# Patient Record
Sex: FEMALE | Race: WHITE | NOT HISPANIC OR LATINO | Employment: FULL TIME | URBAN - METROPOLITAN AREA
[De-identification: names, ages, dates, MRNs, and addresses within clinical notes are randomized per-mention and may not be internally consistent; named-entity substitution may affect disease eponyms.]

---

## 2023-01-09 ENCOUNTER — EVALUATION (OUTPATIENT)
Dept: OCCUPATIONAL THERAPY | Facility: CLINIC | Age: 48
End: 2023-01-09

## 2023-01-09 DIAGNOSIS — S52.501D CLOSED FRACTURE OF DISTAL END OF RIGHT RADIUS WITH ROUTINE HEALING, UNSPECIFIED FRACTURE MORPHOLOGY, SUBSEQUENT ENCOUNTER: Primary | ICD-10-CM

## 2023-01-09 RX ORDER — IBUPROFEN 200 MG
200 TABLET ORAL EVERY 6 HOURS PRN
COMMUNITY

## 2023-01-09 NOTE — PROGRESS NOTES
OT Evaluation     Today's date: 2023  Patient name: Theresa Landau  : 1975  MRN: [id number]  Referring provider: Johnny Torre MD  Dx:   Encounter Diagnosis     ICD-10-CM    1  Closed fracture of distal end of right radius with routine healing, unspecified fracture morphology, subsequent encounter  S52 501D                      Assessment  Assessment details: Theresa Landau is a 49 y o , Right HD female referred to hand therapy for a left distal radius fracture with ORIF on 22  Onset of injury 22 due to fall while ice skating  Patient presents 23 with impaired ROM, strength, and sensation of the left hand  Deficits also noted in pain, edema and functional use of the left UE  Patient has a scar on the left distal forearm that is healing well with no signs or symptoms of infection and only mild adherence  Patient is a good candidate for OT services to abolish pain and edema and restore ROM, strength, coordination, and sensation for a return to independence in daily tasks      Impairments: abnormal coordination, abnormal or restricted ROM, activity intolerance, impaired physical strength, lacks appropriate home exercise program, pain with function and weight-bearing intolerance  Other impairment: Edema; impaired sensation thumb  Functional limitations: Minimal use of the right hand for self care or work tasks  Symptom irritability: highUnderstanding of Dx/Px/POC: excellent   Prognosis: good    Goals  STGs (4 weeks)  Patient will be independent in HEP of ROM, wound care, edema management  Patient will report an average pain level of 4/10  Patient will demonstrate 20 degree improvement in BERGER of the digits  Patient will demonstrate active wrist extension/flexion to 45/45  Patient will demonstrate active forearm pronation/supination to 80/45  Patient will demonstrate full wound healing  LTGs (12 weeks)  Patient will demonstrate independence in a HEP to maintain ROM, strength, and function at discharge  Patient will report an average pain level of 1-2/10 to be independent in daily tasks  Patient will demonstrate BERGER of the digits to WNL  to be independent in self care tasks  Patient will demonstrate AROM of the wrist and forearm WFL to be independent in self care tasks  Patient will demonstrate 5/5 muscle strength in the wrist and forearm to be MI for meal prep  Patient will demonstrate left hand strength within 30% of the right hand to be MI for IADL tasks  Patient will demonstrate resolution of edema      Plan  Plan details: 1/9/23; Begin OT 2x/wk x 6 weeks  Patient would benefit from: skilled occupational therapy and custom splinting  Planned modality interventions: ultrasound, thermotherapy: hydrocollator packs and cryotherapy  Planned therapy interventions: activity modification, compression, fine motor coordination training, graded activity, graded exercise, home exercise program, therapeutic exercise, therapeutic activities, stretching, strengthening, patient education, orthotic fitting/training, neuromuscular re-education and manual therapy  Other planned therapy interventions: IASTM, cupping  kinesio tape; scar and edema mgt  Frequency: 2x week  Duration in weeks: 12  Plan of Care beginning date: 1/9/2023  Plan of Care expiration date: 4/14/2023  Treatment plan discussed with: patient        Subjective Evaluation    History of Present Illness  Date of onset: 12/17/2022  Date of surgery: 12/22/2022  Mechanism of injury: surgery and trauma  Mechanism of injury: Patient reports she was ice skating on 12/17/22 when she fell back and to the side, fracturing her wrist  Went to ED where x rays indicated distal radius fracture  She was casted and referred to orthopedics  Had an ORIF 12/22/22  Patient now presents in a prefab wrist brace   She reports pain and stiffness in her wrist  Patient now presents for OT evaluation and treatment          Not a recurrent problem   Quality of life: good    Pain  Current pain ratin  At best pain ratin  At worst pain ratin  Location: Left wrist  Quality: dull ache and discomfort  Relieving factors: medications, rest and ice  Aggravating factors: nothing  Progression: improved    Social Support  Lives with: adult children    Employment status: working ([employer])  Hand dominance: right      Diagnostic Tests  X-ray: abnormal (Healing fracture)  Treatments  No previous or current treatments  Patient Goals  Patient goals for therapy: decreased edema, decreased pain, increased strength, independence with ADLs/IADLs and increased motion  Patient goal: Drive to work and long distances; do my hair        Objective     Observations     Left Wrist/Hand   Positive for adhesive scar and edema  Negative for drainage  Additional Observation Details  23; steri strips on scar    Tenderness     Left Wrist/Hand   Tenderness in the distal radioulnar joint  Additional Tenderness Details  23: Pain with palpation of distal radius    Neurological Testing     Sensation     Wrist/Hand   Left   Intact: light touch    Comments   Left light touch: Patient reports light tingling in tip of thumb     Active Range of Motion   Left Shoulder   Normal active range of motion    Left Elbow   Flexion: WFL  Extension: WFL  Forearm supination: 27 degrees   Forearm pronation: 70 degrees     Left Wrist   Wrist flexion: 35 degrees   Wrist extension: 25 degrees   Radial deviation: 5 degrees   Ulnar deviation: 20 degrees     Left Thumb   Flexion     CMC: 5 degrees    MP: 35 degrees    DIP: 35 degrees  Extension     DIP: -3 degrees  Palmar Abduction     CMC: 35 degrees  Radial abduction    CMC: 20 degrees  Opposition: 23; BERGER = 72   Kapandji Scale 6/10    Left Digits    Flexion   Index     MCP: 60    PIP: 80    DIP: 45  Middle     MCP: 75    PIP: 90    DIP: 55  Ring     MCP: 70    PIP: 90    DIP: 55  Little     MCP: 60    PIP: 90    DIP: 55  Extension   Index MCP: -10    PIP: -10  Middle     MCP: -20  Ring     MCP: -20  Little     MCP: -15    Additional Active Range of Motion Details  1/9/23: BERGER index = 165; middle = 180; ring = 195; small = 190    Swelling     Left Wrist/Hand   Thumb     Proximal: 16 5 cm  Circumference MCP: 18 7 cm  Circumference wrist: 17 cm    Right Wrist/Hand   Thumb     Proximal: 16 cm  Circumference MCP: 19 cm  Circumference wrist: 15 5 cm             Precautions: ORIF 12/22/22  AROM only until 6 weeks post op (2/2/23), then may start PROM   Access Code: [email or web address]  URL: [email or web address]  Date: 01/09/2023  Prepared by: Tawny Sinha    Exercises  • Seated Thumb IP Flexion AROM with Blocking - 3 x daily - 7 x weekly - 2 sets - 10 reps - 5 hold  • Thumb AROM Opposition To All Fingers - 3 x daily - 7 x weekly - 2 sets - 10 reps - 5 hold  • Seated Thumb Composite Flexion AROM - 3 x daily - 7 x weekly - 2 sets - 10 reps - 5 hold  • Finger Spreading - 3 x daily - 7 x weekly - 2 sets - 10 reps - 5 hold  • Wrist Tendon Gliding - 3 x daily - 7 x weekly - 2 sets - 10 reps - 5 hold  • Wrist AROM Flexion Extension - 3 x daily - 7 x weekly - 2 sets - 10 reps - 5 hold  • Wrist AROM Radial Ulnar Deviation - 3 x daily - 7 x weekly - 2 sets - 10 reps - 5 hold  • Seated Forearm Pronation and Supination AROM - 3 x daily - 7 x weekly - 2 sets - 10 reps - 5 hold          Date 1/9/23       Visit 1       Manuals        Edema massage Size C tg       Scar massage 3'                       Neuro Re-Ed         MNG                                                        Ther Ex        Blocking thumb        Opposition with thumb slides        AROM digits        TGE        Isolated digit ext        Digit abd/add        AROM wrist        Wrist maze        AROM FA        Cones for p/s                                Ther Activity        Opposition        Translation        HEP AROM digits, wrist, FA   Scar massage                       Modalities        MHP 5'

## 2023-01-09 NOTE — LETTER
2023    Desirae Mabry MD  [address]    Patient: Bhanu Ibarra   YOB: 1975   Date of Visit: 2023     Encounter Diagnosis     ICD-10-CM    1  Closed fracture of distal end of right radius with routine healing, unspecified fracture morphology, subsequent encounter  S52 501D           Dear Dr Kim Lopez:    Thank you for your recent referral of Bhanu Ibarra  Please review the attached evaluation summary from [facility]  48  recent visit  Please verify that you agree with the plan of care by signing the attached order  If you have any questions or concerns, please do not hesitate to call  I sincerely appreciate the opportunity to share in the care of one of your patients and hope to have another opportunity to work with you in the near future  Sincerely,    Lucille Taylor, OT      Referring Provider:     I certify that I have read the below Plan of Care and certify the need for these services furnished under this plan of treatment while under my care  Desirae Mabry MD  [address]  Via Fax: [phone]        OT Evaluation     Today's date: 2023  Patient name: Bhanu Ibarra  : 1975  MRN: [id number]  Referring provider: Elle Weaver MD  Dx:   Encounter Diagnosis     ICD-10-CM    1  Closed fracture of distal end of right radius with routine healing, unspecified fracture morphology, subsequent encounter  S52 501D                      Assessment  Assessment details: Bhanu Ibarra is a 49 y o , Right HD female referred to hand therapy for a left distal radius fracture with ORIF on 22  Onset of injury 22 due to fall while ice skating  Patient presents 23 with impaired ROM, strength, and sensation of the left hand  Deficits also noted in pain, edema and functional use of the left UE   Patient has a scar on the left distal forearm that is healing well with no signs or symptoms of infection and only mild adherence  Patient is a good candidate for OT services to abolish pain and edema and restore ROM, strength, coordination, and sensation for a return to independence in daily tasks  Impairments: abnormal coordination, abnormal or restricted ROM, activity intolerance, impaired physical strength, lacks appropriate home exercise program, pain with function and weight-bearing intolerance  Other impairment: Edema; impaired sensation thumb  Functional limitations: Minimal use of the right hand for self care or work tasks  Symptom irritability: highUnderstanding of Dx/Px/POC: excellent   Prognosis: good    Goals  STGs (4 weeks)  Patient will be independent in HEP of ROM, wound care, edema management  Patient will report an average pain level of 4/10  Patient will demonstrate 20 degree improvement in BERGER of the digits  Patient will demonstrate active wrist extension/flexion to 45/45  Patient will demonstrate active forearm pronation/supination to 80/45  Patient will demonstrate full wound healing  LTGs (12 weeks)  Patient will demonstrate independence in a HEP to maintain ROM, strength, and function at discharge  Patient will report an average pain level of 1-2/10 to be independent in daily tasks  Patient will demonstrate BERGER of the digits to WNL  to be independent in self care tasks  Patient will demonstrate AROM of the wrist and forearm WFL to be independent in self care tasks  Patient will demonstrate 5/5 muscle strength in the wrist and forearm to be MI for meal prep  Patient will demonstrate left hand strength within 30% of the right hand to be MI for IADL tasks  Patient will demonstrate resolution of edema      Plan  Plan details: 1/9/23;  Begin OT 2x/wk x 6 weeks  Patient would benefit from: skilled occupational therapy and custom splinting  Planned modality interventions: ultrasound, thermotherapy: hydrocollator packs and cryotherapy  Planned therapy interventions: activity modification, compression, fine motor coordination training, graded activity, graded exercise, home exercise program, therapeutic exercise, therapeutic activities, stretching, strengthening, patient education, orthotic fitting/training, neuromuscular re-education and manual therapy  Other planned therapy interventions: IASTM, cupping  kinesio tape; scar and edema mgt  Frequency: 2x week  Duration in weeks: 12  Plan of Care beginning date: 2023  Plan of Care expiration date: 2023  Treatment plan discussed with: patient        Subjective Evaluation    History of Present Illness  Date of onset: 2022  Date of surgery: 2022  Mechanism of injury: surgery and trauma  Mechanism of injury: Patient reports she was ice skating on 22 when she fell back and to the side, fracturing her wrist  Went to ED where x rays indicated distal radius fracture  She was casted and referred to orthopedics  Had an ORIF 22  Patient now presents in a prefab wrist brace  She reports pain and stiffness in her wrist  Patient now presents for OT evaluation and treatment          Not a recurrent problem   Quality of life: good    Pain  Current pain ratin  At best pain ratin  At worst pain ratin  Location: Left wrist  Quality: dull ache and discomfort  Relieving factors: medications, rest and ice  Aggravating factors: nothing  Progression: improved    Social Support  Lives with: adult children    Employment status: working ([employer])  Hand dominance: right      Diagnostic Tests  X-ray: abnormal (Healing fracture)  Treatments  No previous or current treatments  Patient Goals  Patient goals for therapy: decreased edema, decreased pain, increased strength, independence with ADLs/IADLs and increased motion  Patient goal: Drive to work and long distances; do my hair        Objective     Observations     Left Wrist/Hand   Positive for adhesive scar and edema   Negative for drainage  Additional Observation Details  1/9/23; steri strips on scar    Tenderness     Left Wrist/Hand   Tenderness in the distal radioulnar joint  Additional Tenderness Details  1/9/23: Pain with palpation of distal radius    Neurological Testing     Sensation     Wrist/Hand   Left   Intact: light touch    Comments   Left light touch: Patient reports light tingling in tip of thumb     Active Range of Motion   Left Shoulder   Normal active range of motion    Left Elbow   Flexion: WFL  Extension: WFL  Forearm supination: 27 degrees   Forearm pronation: 70 degrees     Left Wrist   Wrist flexion: 35 degrees   Wrist extension: 25 degrees   Radial deviation: 5 degrees   Ulnar deviation: 20 degrees     Left Thumb   Flexion     CMC: 5 degrees    MP: 35 degrees    DIP: 35 degrees  Extension     DIP: -3 degrees  Palmar Abduction     CMC: 35 degrees  Radial abduction    CMC: 20 degrees  Opposition: 1/9/23; BERGER = 72  Kapandji Scale 6/10    Left Digits    Flexion   Index     MCP: 60    PIP: 80    DIP: 45  Middle     MCP: 75    PIP: 90    DIP: 55  Ring     MCP: 70    PIP: 90    DIP: 55  Little     MCP: 60    PIP: 90    DIP: 55  Extension   Index     MCP: -10    PIP: -10  Middle     MCP: -20  Ring     MCP: -20  Little     MCP: -15    Additional Active Range of Motion Details  1/9/23: BERGER index = 165; middle = 180; ring = 195; small = 190    Swelling     Left Wrist/Hand   Thumb     Proximal: 16 5 cm  Circumference MCP: 18 7 cm  Circumference wrist: 17 cm    Right Wrist/Hand   Thumb     Proximal: 16 cm  Circumference MCP: 19 cm  Circumference wrist: 15 5 cm            Precautions: ORIF 12/22/22  AROM only until 6 weeks post op (2/2/23), then may start PROM   Access Code: [email or web address]  URL: [email or web address]  Date: 01/09/2023  Prepared by: Arlene Patel    Exercises  • Seated Thumb IP Flexion AROM with Blocking - 3 x daily - 7 x weekly - 2 sets - 10 reps - 5 hold  • Thumb AROM Opposition To All Fingers - 3 x daily - 7 x weekly - 2 sets - 10 reps - 5 hold  • Seated Thumb Composite Flexion AROM - 3 x daily - 7 x weekly - 2 sets - 10 reps - 5 hold  • Finger Spreading - 3 x daily - 7 x weekly - 2 sets - 10 reps - 5 hold  • Wrist Tendon Gliding - 3 x daily - 7 x weekly - 2 sets - 10 reps - 5 hold  • Wrist AROM Flexion Extension - 3 x daily - 7 x weekly - 2 sets - 10 reps - 5 hold  • Wrist AROM Radial Ulnar Deviation - 3 x daily - 7 x weekly - 2 sets - 10 reps - 5 hold  • Seated Forearm Pronation and Supination AROM - 3 x daily - 7 x weekly - 2 sets - 10 reps - 5 hold          Date 1/9/23       Visit 1       Manuals        Edema massage Size C tg       Scar massage 3'                       Neuro Re-Ed         MNG                                                        Ther Ex        Blocking thumb        Opposition with thumb slides        AROM digits        TGE        Isolated digit ext        Digit abd/add        AROM wrist        Wrist maze        AROM FA        Cones for p/s                                Ther Activity        Opposition        Translation        HEP AROM digits, wrist, FA   Scar massage                       Modalities        P 5'

## 2023-01-13 ENCOUNTER — OFFICE VISIT (OUTPATIENT)
Dept: OCCUPATIONAL THERAPY | Facility: CLINIC | Age: 48
End: 2023-01-13

## 2023-01-13 DIAGNOSIS — S52.501D CLOSED FRACTURE OF DISTAL END OF RIGHT RADIUS WITH ROUTINE HEALING, UNSPECIFIED FRACTURE MORPHOLOGY, SUBSEQUENT ENCOUNTER: Primary | ICD-10-CM

## 2023-01-13 NOTE — PROGRESS NOTES
Daily Note     Today's date: 2023  Patient name: Elyse Knight  : 1975  MRN: [id number]  Referring provider: Lula Thibodeaux MD  Dx:   Encounter Diagnosis     ICD-10-CM    1  Closed fracture of distal end of right radius with routine healing, unspecified fracture morphology, subsequent encounter  S52 501D                      Subjective: I can tell I'm moving more  I'm going to try to drive to work next week  Objective: See treatment diary below      Assessment: Tolerated treatment well  Patient exhibited good technique with therapeutic exercises  Patient instructed in scar massage  Mild edema still noted      Plan: Continue per plan of care  Precautions: ORIF 22  AROM only until 6 weeks post op (23), then may start PROM   Access Code: [email or web address]  URL: [email or web address]  Date: 2023  Prepared by: Jacob Mitchell    Exercises  • Seated Thumb IP Flexion AROM with Blocking - 3 x daily - 7 x weekly - 2 sets - 10 reps - 5 hold  • Thumb AROM Opposition To All Fingers - 3 x daily - 7 x weekly - 2 sets - 10 reps - 5 hold  • Seated Thumb Composite Flexion AROM - 3 x daily - 7 x weekly - 2 sets - 10 reps - 5 hold  • Finger Spreading - 3 x daily - 7 x weekly - 2 sets - 10 reps - 5 hold  • Wrist Tendon Gliding - 3 x daily - 7 x weekly - 2 sets - 10 reps - 5 hold  • Wrist AROM Flexion Extension - 3 x daily - 7 x weekly - 2 sets - 10 reps - 5 hold  • Wrist AROM Radial Ulnar Deviation - 3 x daily - 7 x weekly - 2 sets - 10 reps - 5 hold  • Seated Forearm Pronation and Supination AROM - 3 x daily - 7 x weekly - 2 sets - 10 reps - 5 hold          Date 23      Visit 1 2      Manuals        Edema massage Size C tg 5'      Scar massage 3' 5'                      Neuro Re-Ed         MNG                                                        Ther Ex        Blocking thumb  x10      Opposition with thumb slides  x10      AROM digits  5' AA      TGE        Isolated digit ext        Digit abd/add  Marbles 1 set      AROM wrist  5' AA      Wrist maze  x10      AROM FA  5' AA      Cones for p/s  1 set                              Ther Activity        Opposition        Translation  Marbles 1 set      HEP AROM digits, wrist, FA   Scar massage Scar massage                      Modalities        MHP 5' 5'

## 2023-01-16 ENCOUNTER — OFFICE VISIT (OUTPATIENT)
Dept: OCCUPATIONAL THERAPY | Facility: CLINIC | Age: 48
End: 2023-01-16

## 2023-01-16 DIAGNOSIS — S52.501D CLOSED FRACTURE OF DISTAL END OF RIGHT RADIUS WITH ROUTINE HEALING, UNSPECIFIED FRACTURE MORPHOLOGY, SUBSEQUENT ENCOUNTER: Primary | ICD-10-CM

## 2023-01-16 NOTE — PROGRESS NOTES
Daily Note     Today's date: 2023  Patient name: Frank Borrego  : 1975  MRN: [id number]  Referring provider: Monse Aguiar MD  Dx:   Encounter Diagnosis     ICD-10-CM    1  Closed fracture of distal end of right radius with routine healing, unspecified fracture morphology, subsequent encounter  S52 712D                      Subjective: I'm taking the splint off more at home and I take it off at night to sleep  I rest my hand on a squishy pillow at night      Objective: See treatment diary below      Assessment: Tolerated treatment well  Patient exhibited good technique with therapeutic exercises  Patient instructed in AAROM digits  Scar is fully healed and all steri strips have come off  Mild scar adherence noted  Plan: Continue per plan of care  Precautions: ORIF 22  AROM only until 6 weeks post op (23), then may start PROM   Access Code: [email or web address]  URL: [email or web address]  Date: 2023  Prepared by: Sandra Velasquez    Exercises  • Seated Thumb IP Flexion AROM with Blocking - 3 x daily - 7 x weekly - 2 sets - 10 reps - 5 hold  • Thumb AROM Opposition To All Fingers - 3 x daily - 7 x weekly - 2 sets - 10 reps - 5 hold  • Seated Thumb Composite Flexion AROM - 3 x daily - 7 x weekly - 2 sets - 10 reps - 5 hold  • Finger Spreading - 3 x daily - 7 x weekly - 2 sets - 10 reps - 5 hold  • Wrist Tendon Gliding - 3 x daily - 7 x weekly - 2 sets - 10 reps - 5 hold  • Wrist AROM Flexion Extension - 3 x daily - 7 x weekly - 2 sets - 10 reps - 5 hold  • Wrist AROM Radial Ulnar Deviation - 3 x daily - 7 x weekly - 2 sets - 10 reps - 5 hold  • Seated Forearm Pronation and Supination AROM - 3 x daily - 7 x weekly - 2 sets - 10 reps - 5 hold          Date 23     Visit 1 2 3     Manuals        Edema massage Size C tg 5' 5'     Scar massage 3' 5' 5'                     Neuro Re-Ed         MNG                                                        Ther Ex Blocking thumb  x10 x20     Opposition with thumb slides  x10 x20     AROM digits  5' AA 5' AA     TGE        Isolated digit ext   x10     Digit abd/add  Marbles 1 set X10; marbles 1 set     AROM wrist  5' AA 5' AA     Praying hand stretch   10" x 20     Wrist maze  x10      AROM FA  5' AA 5' AA     Cones for p/s  1 set      Wrist PREs ext, flex, dt   Med pvc pipe x 20 ea     FA PREs   Med pvc pipe x 20     Sustained grasp w/ pencils   2 sets                                     Ther Activity        Opposition        Translation  Marbles 1 set      HEP AROM digits, wrist, FA   Scar massage Scar massage Wrist, FA PREs with pvc pipe 1x/day                     Modalities        MHP 5' 5' 5'

## 2023-01-20 ENCOUNTER — OFFICE VISIT (OUTPATIENT)
Dept: OCCUPATIONAL THERAPY | Facility: CLINIC | Age: 48
End: 2023-01-20

## 2023-01-20 DIAGNOSIS — S52.501D CLOSED FRACTURE OF DISTAL END OF RIGHT RADIUS WITH ROUTINE HEALING, UNSPECIFIED FRACTURE MORPHOLOGY, SUBSEQUENT ENCOUNTER: Primary | ICD-10-CM

## 2023-01-20 NOTE — PROGRESS NOTES
Daily Note     Today's date: 2023  Patient name: Lola Orellana  : 1975  MRN: [id number]  Referring provider: Arya Parada MD  Dx:   Encounter Diagnosis     ICD-10-CM    1  Closed fracture of distal end of right radius with routine healing, unspecified fracture morphology, subsequent encounter  S52 501D                      Subjective: I drove to [facility] and it was okay      Objective: See treatment diary below  AROM wrist ext = 45 ( improved 20), flex = 45 ( improved 15), RD = 15 ( improved 10); UD = 35 ( improved 15)  AROM FA sup = 25 ( SQ); Pron = 75 ( improved 5 )    Assessment: Tolerated treatment well  Patient exhibited good technique with therapeutic exercises  Updated HEP for PROM  Still tight in all planes wrist and FA      Plan: Continue per plan of care  Precautions: ORIF 22  AROM only until 6 weeks post op (23), then may start PROM   Access Code: [email or web address]  URL: [email or web address]  Date: 2023  Prepared by: Rebel Mcallister    Exercises  • Seated Thumb IP Flexion AROM with Blocking - 3 x daily - 7 x weekly - 2 sets - 10 reps - 5 hold  • Thumb AROM Opposition To All Fingers - 3 x daily - 7 x weekly - 2 sets - 10 reps - 5 hold  • Seated Thumb Composite Flexion AROM - 3 x daily - 7 x weekly - 2 sets - 10 reps - 5 hold  • Finger Spreading - 3 x daily - 7 x weekly - 2 sets - 10 reps - 5 hold  • Wrist Tendon Gliding - 3 x daily - 7 x weekly - 2 sets - 10 reps - 5 hold  • Wrist AROM Flexion Extension - 3 x daily - 7 x weekly - 2 sets - 10 reps - 5 hold  • Wrist AROM Radial Ulnar Deviation - 3 x daily - 7 x weekly - 2 sets - 10 reps - 5 hold  • Seated Forearm Pronation and Supination AROM - 3 x daily - 7 x weekly - 2 sets - 10 reps - 5 hold          Date 23    Visit 1 2 3 4    Manuals        Edema massage Size C tg 5' 5' 5'    Scar massage 3' 5' 5' 5'                    Neuro Re-Ed         MNG Ther Ex        Blocking thumb  x10 x20 x20    Opposition with thumb slides  x10 x20 x20    AROM digits  5' AA 5' AA 5' AA    TGE        Isolated digit ext   x10 x10    Digit abd/add  Marbles 1 set X10; marbles 1 set     AROM wrist  5' AA 5' AA 5' AA    Praying hand stretch   10" x 20 10" x 20    Wrist maze  x10      AROM FA  5' AA 5' AA 5' AA    Cones for p/s  1 set      Wrist PREs ext, flex, dt   Med pvc pipe x 20 ea     FA PREs   Med pvc pipe x 20 Hammer stretch x 20    Sustained grasp w/ pencils   2 sets 2 sets                                    Ther Activity        Opposition        Translation  Marbles 1 set      HEP AROM digits, wrist, FA   Scar massage Scar massage Wrist, FA PREs with pvc pipe 1x/day Passive wrist, FA stretches                    Modalities        MHP 5' 5' 5' 5'

## 2023-01-27 ENCOUNTER — APPOINTMENT (OUTPATIENT)
Dept: OCCUPATIONAL THERAPY | Facility: CLINIC | Age: 48
End: 2023-01-27

## 2023-01-30 ENCOUNTER — OFFICE VISIT (OUTPATIENT)
Dept: OCCUPATIONAL THERAPY | Facility: CLINIC | Age: 48
End: 2023-01-30

## 2023-01-30 DIAGNOSIS — S52.501D CLOSED FRACTURE OF DISTAL END OF RIGHT RADIUS WITH ROUTINE HEALING, UNSPECIFIED FRACTURE MORPHOLOGY, SUBSEQUENT ENCOUNTER: Primary | ICD-10-CM

## 2023-01-30 NOTE — PROGRESS NOTES
Daily Note     Today's date: 2023  Patient name: Lola Orellana  : 1975  MRN: [id number]  Referring provider: Arya Parada MD  Dx:   Encounter Diagnosis     ICD-10-CM    1  Closed fracture of distal end of right radius with routine healing, unspecified fracture morphology, subsequent encounter  S52 501D                      Subjective: I've been doing the hammer stretch and I think that really helps      Objective: See treatment diary below      Assessment: Tolerated treatment well  Patient exhibited good technique with therapeutic exercises  Improved thumb flexion and forearm roation noted  Scar adherence still noted  Patient reports she is not wearing her splint very often anymore      Plan: Continue per plan of care  Precautions: ORIF 22  AROM only until 6 weeks post op (23), then may start PROM   Access Code: [email or web address]  URL: [email or web address]  Date: 2023  Prepared by: Rebel Mcallister    Exercises  • Seated Thumb IP Flexion AROM with Blocking - 3 x daily - 7 x weekly - 2 sets - 10 reps - 5 hold  • Thumb AROM Opposition To All Fingers - 3 x daily - 7 x weekly - 2 sets - 10 reps - 5 hold  • Seated Thumb Composite Flexion AROM - 3 x daily - 7 x weekly - 2 sets - 10 reps - 5 hold  • Finger Spreading - 3 x daily - 7 x weekly - 2 sets - 10 reps - 5 hold  • Wrist Tendon Gliding - 3 x daily - 7 x weekly - 2 sets - 10 reps - 5 hold  • Wrist AROM Flexion Extension - 3 x daily - 7 x weekly - 2 sets - 10 reps - 5 hold  • Wrist AROM Radial Ulnar Deviation - 3 x daily - 7 x weekly - 2 sets - 10 reps - 5 hold  • Seated Forearm Pronation and Supination AROM - 3 x daily - 7 x weekly - 2 sets - 10 reps - 5 hold          Date 23   Visit 1 2 3 4 5   Manuals        Edema massage Size C tg 5' 5' 5' 5'   Scar massage 3' 5' 5' 5' 5'   Kinesio tape for scar mobility     2'           Neuro Re-Ed         MNG Ther Ex        Blocking thumb  x10 x20 x20 x10   Opposition with thumb slides  x10 x20 x20 x20   AROM digits  5' AA 5' AA 5' AA 5' A   TGE        Isolated digit ext   x10 x10    Digit abd/add  Marbles 1 set X10; marbles 1 set  Marbles 1 set   AROM wrist  5' AA 5' AA 5' AA 5' AA   Praying hand stretch   10" x 20 10" x 20 x10   Wrist maze  x10   x10   AROM FA  5' AA 5' AA 5' AA 5' A   Cones for p/s  1 set      Wrist PREs ext, flex, dt   Med pvc pipe x 20 ea  Med pvc x 20 ea   FA PREs   Med pvc pipe x 20 Hammer stretch x 20 R FB x 30   Sustained grasp w/ pencils   2 sets 2 sets    Ball on wall for wrist ext     x10 w/ soft ball                           Ther Activity        Opposition        Translation  Marbles 1 set   Marbles 1 set   HEP AROM digits, wrist, FA   Scar massage Scar massage Wrist, FA PREs with pvc pipe 1x/day Passive wrist, FA stretches Kinesio tape wear, care, precuations                   Modalities        MHP 5' 5' 5' 5' 5'

## 2023-02-02 ENCOUNTER — OFFICE VISIT (OUTPATIENT)
Dept: OCCUPATIONAL THERAPY | Facility: CLINIC | Age: 48
End: 2023-02-02

## 2023-02-02 DIAGNOSIS — S52.501D CLOSED FRACTURE OF DISTAL END OF RIGHT RADIUS WITH ROUTINE HEALING, UNSPECIFIED FRACTURE MORPHOLOGY, SUBSEQUENT ENCOUNTER: Primary | ICD-10-CM

## 2023-02-02 NOTE — PROGRESS NOTES
Daily Note     Today's date: 2023  Patient name: Susan Egn  : 1975  MRN: [id number]  Referring provider: Charli Pak MD  Dx:   Encounter Diagnosis     ICD-10-CM    1  Closed fracture of distal end of right radius with routine healing, unspecified fracture morphology, subsequent encounter  S52 501D                      Subjective: I liked the tape on my scar  Objective: See treatment diary below      Assessment: Tolerated treatment well  Patient exhibited good technique with therapeutic exercises and would benefit from continued OT  Improved AROM digits, wrist, forearm  Some hypersensitivity still noted in scar      Plan: Continue per plan of care  Progress note during next visit  Precautions: ORIF 22  AROM only until 6 weeks post op (23), then may start PROM   Access Code: [email or web address]  URL: [email or web address]  Date: 2023  Prepared by: Harrison Franco    Exercises  • Seated Thumb IP Flexion AROM with Blocking - 3 x daily - 7 x weekly - 2 sets - 10 reps - 5 hold  • Thumb AROM Opposition To All Fingers - 3 x daily - 7 x weekly - 2 sets - 10 reps - 5 hold  • Seated Thumb Composite Flexion AROM - 3 x daily - 7 x weekly - 2 sets - 10 reps - 5 hold  • Finger Spreading - 3 x daily - 7 x weekly - 2 sets - 10 reps - 5 hold  • Wrist Tendon Gliding - 3 x daily - 7 x weekly - 2 sets - 10 reps - 5 hold  • Wrist AROM Flexion Extension - 3 x daily - 7 x weekly - 2 sets - 10 reps - 5 hold  • Wrist AROM Radial Ulnar Deviation - 3 x daily - 7 x weekly - 2 sets - 10 reps - 5 hold  • Seated Forearm Pronation and Supination AROM - 3 x daily - 7 x weekly - 2 sets - 10 reps - 5 hold          Date 23   Visit 6 2 3 4 5   Manuals        Edema massage 5' 5' 5' 5' 5'   Scar massage 5' 5' 5' 5' 5'   Kinesio tape for scar mobility 2'    2'           Neuro Re-Ed         MNG distal x10                                                       Ther Ex Blocking thumb x10 x10 x20 x20 x10   Opposition with thumb slides x20 x10 x20 x20 x20   AROM digits 3' AA 5' AA 5' AA 5' AA 5' A   TGE        Isolated digit ext x10  x10 x10    Digit abd/add  Marbles 1 set X10; marbles 1 set  Marbles 1 set   AROM wrist  5' AA 5' AA 5' AA 5' AA   Praying hand stretch 10" x 10  10" x 20 10" x 20 x10   Wrist maze  x10   x10   AROM FA 5' AA 5' AA 5' AA 5' AA 5' A   Cones for p/s  1 set      Wrist PREs ext, flex, dt 1# x 20  Med pvc pipe x 20 ea  Med pvc x 20 ea   FA PREs 1# x 20 ea  Med pvc pipe x 20 Hammer stretch x 20 R FB x 30   Sustained grasp w/ pencils   2 sets 2 sets    Ball on wall for wrist ext     x10 w/ soft ball    strength HG 15# x 20                       Ther Activity        Opposition        Translation  Marbles 1 set   Marbles 1 set   HEP Median nerve glide Scar massage Wrist, FA PREs with pvc pipe 1x/day Passive wrist, FA stretches Kinesio tape wear, care, precuations                   Modalities        MHP 5' 5' 5' 5' 5'

## 2023-02-03 ENCOUNTER — APPOINTMENT (OUTPATIENT)
Dept: OCCUPATIONAL THERAPY | Facility: CLINIC | Age: 48
End: 2023-02-03

## 2023-02-06 ENCOUNTER — OFFICE VISIT (OUTPATIENT)
Dept: OCCUPATIONAL THERAPY | Facility: CLINIC | Age: 48
End: 2023-02-06

## 2023-02-06 DIAGNOSIS — S52.501D CLOSED FRACTURE OF DISTAL END OF RIGHT RADIUS WITH ROUTINE HEALING, UNSPECIFIED FRACTURE MORPHOLOGY, SUBSEQUENT ENCOUNTER: Primary | ICD-10-CM

## 2023-02-06 NOTE — LETTER
2023    Aditya Grider, Rajendra [address]    Patient: Tim Gallo   YOB: 1975   Date of Visit: 2023     Encounter Diagnosis     ICD-10-CM    1  Closed fracture of distal end of right radius with routine healing, unspecified fracture morphology, subsequent encounter  S52 501D           Dear Dr Cheryl Aguilar:    Thank you for your recent referral of Tim Gallo  Please review the attached evaluation summary from Ryan MERCEDES  48  recent visit  Please verify that you agree with the plan of care by signing the attached order  If you have any questions or concerns, please do not hesitate to call  I sincerely appreciate the opportunity to share in the care of one of your patients and hope to have another opportunity to work with you in the near future  Sincerely,    Blanche Guzman, COOPER      Referring Provider:     I certify that I have read the below Plan of Care and certify the need for these services furnished under this plan of treatment while under my care  Aditya Grider MD  [address] [place] [address]  Via Fax: [phone]        OT Re-Evaluation     Today's date: 2023  Patient name: Tim Gallo  : 1975  MRN: [id number]  Referring provider: Clari Yang MD  Dx:   Encounter Diagnosis     ICD-10-CM    1  Closed fracture of distal end of right radius with routine healing, unspecified fracture morphology, subsequent encounter  S52 501D                      Assessment  Assessment details: Tim Gallo is a 49 y o , Right HD female referred to hand therapy for a left distal radius fracture with ORIF on 22  Onset of injury 22 due to fall while ice skating  Patient presents 23 with impaired ROM, strength, and sensation of the left hand  Deficits also noted in pain, edema and functional use of the left UE   Patient has a scar on the left distal forearm that is healing well with no signs or symptoms of infection and only mild adherence  Patient is a good candidate for OT services to abolish pain and edema and restore ROM, strength, coordination, and sensation for a return to independence in daily tasks  2/6/23: Patient seen 6 times in OT  She is compliant with HEP of A/PROM, scar massage  She is no longer using her splint for light activities  Mild to moderate pain and scar adherence  Improved ROM in the forearm, wrist, digits  Strength not initiated  Continue OT 2x/wk x 8 weeks  Impairments: abnormal coordination, abnormal or restricted ROM, activity intolerance, impaired physical strength, lacks appropriate home exercise program, pain with function and weight-bearing intolerance  Other impairment: Edema; impaired sensation thumb  Functional limitations: Moderate use of the right hand for self care or work tasks  Symptom irritability: moderateUnderstanding of Dx/Px/POC: excellent   Prognosis: good    Goals  STGs (4 weeks)  Patient will be independent in HEP of ROM, wound care, edema management  MET  Patient will report an average pain level of 4/10  MET  Patient will demonstrate 20 degree improvement in BERGER of the digits  MET  Patient will demonstrate active wrist extension/flexion to 45/45  MET  Patient will demonstrate active forearm pronation/supination to 80/45  PARTIALLY MET  Patient will demonstrate full wound healing  MET  LTGs (12 weeks)  Patient will demonstrate independence in a HEP to maintain ROM, strength, and function at discharge  ON GOING  Patient will report an average pain level of 1-2/10 to be independent in daily tasks  NOT MET  Patient will demonstrate BERGER of the digits to WNL  to be independent in self care tasks  NOT MET  Patient will demonstrate AROM of the wrist and forearm WFL to be independent in self care tasks  Patient will demonstrate 5/5 muscle strength in the wrist and forearm to be MI for meal prep   NOT ADDRESSED  Patient will demonstrate left hand strength within 30% of the right hand to be MI for IADL tasks  NOT ADDRESSED  Patient will demonstrate resolution of edema  NOT MET      Plan  Plan details: 23; Begin OT 2x/wk x 6 weeks  23: Continue OT 2x/wk x 6 weeks  Patient would benefit from: skilled occupational therapy and custom splinting  Planned modality interventions: ultrasound, thermotherapy: hydrocollator packs and cryotherapy  Planned therapy interventions: activity modification, compression, fine motor coordination training, graded activity, graded exercise, home exercise program, therapeutic exercise, therapeutic activities, stretching, strengthening, patient education, orthotic fitting/training, neuromuscular re-education and manual therapy  Other planned therapy interventions: IASTM, cupping  kinesio tape; scar and edema mgt  Frequency: 2x week  Duration in weeks: 12  Plan of Care beginning date: 2023  Plan of Care expiration date: 2023  Treatment plan discussed with: patient        Subjective Evaluation    History of Present Illness  Date of onset: 2022  Date of surgery: 2022  Mechanism of injury: surgery and trauma  Mechanism of injury: Patient reports she was ice skating on 22 when she fell back and to the side, fracturing her wrist  Went to ED where x rays indicated distal radius fracture  She was casted and referred to orthopedics  Had an ORIF 22  Patient now presents in a prefab wrist brace  She reports pain and stiffness in her wrist  Patient now presents for OT evaluation and treatment    23; I still have trouble opening tight jars  I can  and carry light things now   I'm not wearing the brace now          Not a recurrent problem   Quality of life: good    Pain  Current pain ratin  At best pain ratin  At worst pain ratin  Location: Left wrist  Quality: dull ache and discomfort  Relieving factors: medications, rest and ice  Aggravating factors: nothing  Progression: improved    Social Support  Lives with: adult children    Employment status: working ([employer])  [employer] dominance: right      Diagnostic Tests  X-ray: abnormal (Healing fracture)  Treatments  Current treatment: occupational therapy  Patient Goals  Patient goals for therapy: decreased edema, decreased pain, increased strength, independence with ADLs/IADLs and increased motion  Patient goal: Drive to work and long distances; do my hair        Objective     Observations     Left Wrist/Hand   Positive for adhesive scar and edema  Negative for drainage  Additional Observation Details  1/9/23; steri strips on scar  2/6/23: Scar fully healed with mild adherence and edema    Tenderness     Left Wrist/Hand   No tenderness in the distal radioulnar joint  Additional Tenderness Details  1/9/23: Pain with palpation of distal radius  2/6/23: Mild tenderness distal radius    Neurological Testing     Sensation     Wrist/Hand   Left   Intact: light touch    Comments   Left light touch: Patient reports light tingling in tip of thumb     Additional Neurological Details  2/6/23: Patient still reports tingling tip of thumb    Active Range of Motion   Left Shoulder   Normal active range of motion    Left Elbow   Flexion: WFL  Extension: WFL  Forearm supination: 40 degrees   Forearm pronation: 85 degrees WFL    Left Wrist   Wrist flexion: 60 degrees   Wrist extension: 53 degrees   Radial deviation: 20 degrees WFL  Ulnar deviation: 35 degrees     Left Thumb   Flexion     CMC: 20 degrees    MP: 60 degrees    DIP: 45 degrees  Palmar Abduction     CMC: 35 degrees  Radial abduction    CMC: 45 degrees  Opposition: 1/9/23; BERGER = 72  Kapandji Scale 6/10  2/6/23: BERGER = 125   Kapandji Scale 9/10    Additional Active Range of Motion Details  2/6/23: Supination improved 13 degrees and pronation improved 15 degrees  2/6/23: flexion improved 25 degrees; extension improved 28 degrees; radial and ulnar deviation improved 15 degrees each  1/9/23: BERGER index = 165; middle = 180; ring = 195; small = 190  2/6/23: Now able to make a composite fist  End range intrinsic tightness noted    Swelling     Left Wrist/Hand   Thumb     Proximal: 6 cm  Circumference MCP: 18 7 cm  Circumference wrist: 16 5 cm    Right Wrist/Hand   Thumb     Proximal: 6 cm  Circumference MCP: 19 cm  Circumference wrist: 15 5 cm            Precautions: ORIF 12/22/22  AROM only until 6 weeks post op (2/2/23), then may start PROM   Access Code: [email or web address]  URL: [email or web address]  Date: 01/09/2023  Prepared by: Mirian Show    Exercises  • Seated Thumb IP Flexion AROM with Blocking - 3 x daily - 7 x weekly - 2 sets - 10 reps - 5 hold  • Thumb AROM Opposition To All Fingers - 3 x daily - 7 x weekly - 2 sets - 10 reps - 5 hold  • Seated Thumb Composite Flexion AROM - 3 x daily - 7 x weekly - 2 sets - 10 reps - 5 hold  • Finger Spreading - 3 x daily - 7 x weekly - 2 sets - 10 reps - 5 hold  • Wrist Tendon Gliding - 3 x daily - 7 x weekly - 2 sets - 10 reps - 5 hold  • Wrist AROM Flexion Extension - 3 x daily - 7 x weekly - 2 sets - 10 reps - 5 hold  • Wrist AROM Radial Ulnar Deviation - 3 x daily - 7 x weekly - 2 sets - 10 reps - 5 hold  • Seated Forearm Pronation and Supination AROM - 3 x daily - 7 x weekly - 2 sets - 10 reps - 5 hold          Date 2/2/23 2/6/23 1/16/23 1/20 1/30/23   Visit 6 7 RE 3 4 5   Manuals        Edema massage 5' 5' 5' 5' 5'   Scar massage 5' 5' 5' 5' 5'   Kinesio tape for scar mobility 2'    2'           Neuro Re-Ed         MNG distal x10                                                       Ther Ex        Blocking thumb x10 x10 x20 x20 x10   Opposition with thumb slides x20 x10 x20 x20 x20   AROM digits 3' AA 5' AA 5' AA 5' AA 5' A   TGE        Isolated digit ext x10  x10 x10    Digit abd/add  Marbles 1 set X10; marbles 1 set  Marbles 1 set   Intrinsic minus stretch  10" x 10      AROM wrist 5' AA 5' AA 5' AA 5' AA   Praying hand stretch 10" x 10 x10 10" x 20 10" x 20 x10   Wrist maze     x10   AROM FA 5' AA 5' AA 5' AA 5' AA 5' A   Cones for p/s        Wrist PREs ext, flex, dt 1# x 20  Med pvc pipe x 20 ea  Med pvc x 20 ea   FA PREs 1# x 20 ea  Med pvc pipe x 20 Hammer stretch x 20 R FB x 30   Sustained grasp w/ pencils   2 sets 2 sets    Ball on wall for wrist ext     x10 w/ soft ball    strength HG 15# x 20                       Ther Activity        Opposition        Translation     Marbles 1 set   HEP Median nerve glide Results of testing; POC; intrinsic minus stretch Wrist, FA PREs with pvc pipe 1x/day Passive wrist, FA stretches Kinesio tape wear, care, precuations                   Modalities        MHP 5' 5' 5' 5' 5'

## 2023-02-06 NOTE — PROGRESS NOTES
OT Re-Evaluation     Today's date: 2023  Patient name: Kit Eli  : 1975  MRN: [id number]  Referring provider: Apolinar Pichardo MD  Dx:   Encounter Diagnosis     ICD-10-CM    1  Closed fracture of distal end of right radius with routine healing, unspecified fracture morphology, subsequent encounter  S52 501D                      Assessment  Assessment details: Kit Eli is a 49 y o , Right HD female referred to hand therapy for a left distal radius fracture with ORIF on 22  Onset of injury 22 due to fall while ice skating  Patient presents 23 with impaired ROM, strength, and sensation of the left hand  Deficits also noted in pain, edema and functional use of the left UE  Patient has a scar on the left distal forearm that is healing well with no signs or symptoms of infection and only mild adherence  Patient is a good candidate for OT services to abolish pain and edema and restore ROM, strength, coordination, and sensation for a return to independence in daily tasks  23: Patient seen 6 times in OT  She is compliant with HEP of A/PROM, scar massage  She is no longer using her splint for light activities  Mild to moderate pain and scar adherence  Improved ROM in the forearm, wrist, digits  Strength not initiated  Continue OT 2x/wk x 8 weeks  Impairments: abnormal coordination, abnormal or restricted ROM, activity intolerance, impaired physical strength, lacks appropriate home exercise program, pain with function and weight-bearing intolerance  Other impairment: Edema; impaired sensation thumb  Functional limitations: Moderate use of the right hand for self care or work tasks  Symptom irritability: moderateUnderstanding of Dx/Px/POC: excellent   Prognosis: good    Goals  STGs (4 weeks)  Patient will be independent in HEP of ROM, wound care, edema management  MET  Patient will report an average pain level of 4/10   MET  Patient will demonstrate 20 degree improvement in BERGER of the digits  MET  Patient will demonstrate active wrist extension/flexion to 45/45  MET  Patient will demonstrate active forearm pronation/supination to 80/45  PARTIALLY MET  Patient will demonstrate full wound healing  MET  LTGs (12 weeks)  Patient will demonstrate independence in a HEP to maintain ROM, strength, and function at discharge  ON GOING  Patient will report an average pain level of 1-2/10 to be independent in daily tasks  NOT MET  Patient will demonstrate BERGER of the digits to WNL  to be independent in self care tasks  NOT MET  Patient will demonstrate AROM of the wrist and forearm WFL to be independent in self care tasks  Patient will demonstrate 5/5 muscle strength in the wrist and forearm to be MI for meal prep  NOT ADDRESSED  Patient will demonstrate left hand strength within 30% of the right hand to be MI for IADL tasks  NOT ADDRESSED  Patient will demonstrate resolution of edema  NOT MET      Plan  Plan details: 1/9/23;  Begin OT 2x/wk x 6 weeks  2/6/23: Continue OT 2x/wk x 6 weeks  Patient would benefit from: skilled occupational therapy and custom splinting  Planned modality interventions: ultrasound, thermotherapy: hydrocollator packs and cryotherapy  Planned therapy interventions: activity modification, compression, fine motor coordination training, graded activity, graded exercise, home exercise program, therapeutic exercise, therapeutic activities, stretching, strengthening, patient education, orthotic fitting/training, neuromuscular re-education and manual therapy  Other planned therapy interventions: IASTM, cupping  kinesio tape; scar and edema mgt  Frequency: 2x week  Duration in weeks: 12  Plan of Care beginning date: 1/9/2023  Plan of Care expiration date: 4/14/2023  Treatment plan discussed with: patient        Subjective Evaluation    History of Present Illness  Date of onset: 12/17/2022  Date of surgery: 12/22/2022  Mechanism of injury: surgery and trauma  Mechanism of injury: Patient reports she was ice skating on 22 when she fell back and to the side, fracturing her wrist  Went to ED where x rays indicated distal radius fracture  She was casted and referred to orthopedics  Had an ORIF 22  Patient now presents in a prefab wrist brace  She reports pain and stiffness in her wrist  Patient now presents for OT evaluation and treatment    23; I still have trouble opening tight jars  I can  and carry light things now  I'm not wearing the brace now          Not a recurrent problem   Quality of life: good    Pain  Current pain ratin  At best pain ratin  At worst pain ratin  Location: Left wrist  Quality: dull ache and discomfort  Relieving factors: medications, rest and ice  Aggravating factors: nothing  Progression: improved    Social Support  Lives with: adult children    Employment status: working ([employer])  Hand dominance: right      Diagnostic Tests  X-ray: abnormal (Healing fracture)  Treatments  Current treatment: occupational therapy  Patient Goals  Patient goals for therapy: decreased edema, decreased pain, increased strength, independence with ADLs/IADLs and increased motion  Patient goal: Drive to work and long distances; do my hair        Objective     Observations     Left Wrist/Hand   Positive for adhesive scar and edema  Negative for drainage  Additional Observation Details  23; steri strips on scar  23: Scar fully healed with mild adherence and edema    Tenderness     Left Wrist/Hand   No tenderness in the distal radioulnar joint       Additional Tenderness Details  23: Pain with palpation of distal radius  23: Mild tenderness distal radius    Neurological Testing     Sensation     Wrist/Hand   Left   Intact: light touch    Comments   Left light touch: Patient reports light tingling in tip of thumb     Additional Neurological Details  23: Patient still reports tingling tip of thumb    Active Range of Motion   Left Shoulder   Normal active range of motion    Left Elbow   Flexion: WFL  Extension: WFL  Forearm supination: 40 degrees   Forearm pronation: 85 degrees WFL    Left Wrist   Wrist flexion: 60 degrees   Wrist extension: 53 degrees   Radial deviation: 20 degrees WFL  Ulnar deviation: 35 degrees     Left Thumb   Flexion     CMC: 20 degrees    MP: 60 degrees    DIP: 45 degrees  Palmar Abduction     CMC: 35 degrees  Radial abduction    CMC: 45 degrees  Opposition: 1/9/23; BERGER = 72  Kapandji Scale 6/10  2/6/23: BERGER = 125  Kapandji Scale 9/10    Additional Active Range of Motion Details  2/6/23: Supination improved 13 degrees and pronation improved 15 degrees  2/6/23: flexion improved 25 degrees; extension improved 28 degrees; radial and ulnar deviation improved 15 degrees each  1/9/23: BERGER index = 165; middle = 180; ring = 195; small = 190  2/6/23: Now able to make a composite fist  End range intrinsic tightness noted    Swelling     Left Wrist/Hand   Thumb     Proximal: 6 cm  Circumference MCP: 18 7 cm  Circumference wrist: 16 5 cm    Right Wrist/Hand   Thumb     Proximal: 6 cm  Circumference MCP: 19 cm  Circumference wrist: 15 5 cm             Precautions: ORIF 12/22/22  AROM only until 6 weeks post op (2/2/23), then may start PROM   Access Code: [email or web address]  URL: [email or web address]  Date: 01/09/2023  Prepared by: Sarahi Alhaji    Exercises  • Seated Thumb IP Flexion AROM with Blocking - 3 x daily - 7 x weekly - 2 sets - 10 reps - 5 hold  • Thumb AROM Opposition To All Fingers - 3 x daily - 7 x weekly - 2 sets - 10 reps - 5 hold  • Seated Thumb Composite Flexion AROM - 3 x daily - 7 x weekly - 2 sets - 10 reps - 5 hold  • Finger Spreading - 3 x daily - 7 x weekly - 2 sets - 10 reps - 5 hold  • Wrist Tendon Gliding - 3 x daily - 7 x weekly - 2 sets - 10 reps - 5 hold  • Wrist AROM Flexion Extension - 3 x daily - 7 x weekly - 2 sets - 10 reps - 5 hold  • Wrist AROM Radial Ulnar Deviation - 3 x daily - 7 x weekly - 2 sets - 10 reps - 5 hold  • Seated Forearm Pronation and Supination AROM - 3 x daily - 7 x weekly - 2 sets - 10 reps - 5 hold          Date 2/2/23 2/6/23 1/16/23 1/20 1/30/23   Visit 6 7 RE 3 4 5   Manuals        Edema massage 5' 5' 5' 5' 5'   Scar massage 5' 5' 5' 5' 5'   Kinesio tape for scar mobility 2'    2'           Neuro Re-Ed         MNG distal x10                                                       Ther Ex        Blocking thumb x10 x10 x20 x20 x10   Opposition with thumb slides x20 x10 x20 x20 x20   AROM digits 3' AA 5' AA 5' AA 5' AA 5' A   TGE        Isolated digit ext x10  x10 x10    Digit abd/add  Marbles 1 set X10; marbles 1 set  Marbles 1 set   Intrinsic minus stretch  10" x 10      AROM wrist  5' AA 5' AA 5' AA 5' AA   Praying hand stretch 10" x 10 x10 10" x 20 10" x 20 x10   Wrist maze     x10   AROM FA 5' AA 5' AA 5' AA 5' AA 5' A   Cones for p/s        Wrist PREs ext, flex, dt 1# x 20  Med pvc pipe x 20 ea  Med pvc x 20 ea   FA PREs 1# x 20 ea  Med pvc pipe x 20 Hammer stretch x 20 R FB x 30   Sustained grasp w/ pencils   2 sets 2 sets    Ball on wall for wrist ext     x10 w/ soft ball    strength HG 15# x 20                       Ther Activity        Opposition        Translation     Marbles 1 set   HEP Median nerve glide Results of testing; POC; intrinsic minus stretch Wrist, FA PREs with pvc pipe 1x/day Passive wrist, FA stretches Kinesio tape wear, care, precuations                   Modalities        MHP 5' 5' 5' 5' 5'

## 2023-02-07 ENCOUNTER — TELEPHONE (OUTPATIENT)
Dept: OBGYN CLINIC | Age: 48
End: 2023-02-07

## 2023-02-10 ENCOUNTER — APPOINTMENT (OUTPATIENT)
Dept: OCCUPATIONAL THERAPY | Facility: CLINIC | Age: 48
End: 2023-02-10

## 2023-02-21 ENCOUNTER — OFFICE VISIT (OUTPATIENT)
Dept: OCCUPATIONAL THERAPY | Facility: CLINIC | Age: 48
End: 2023-02-21

## 2023-02-21 DIAGNOSIS — S52.501D CLOSED FRACTURE OF DISTAL END OF RIGHT RADIUS WITH ROUTINE HEALING, UNSPECIFIED FRACTURE MORPHOLOGY, SUBSEQUENT ENCOUNTER: Primary | ICD-10-CM

## 2023-02-21 NOTE — PROGRESS NOTES
Daily Note     Today's date: 2023  Patient name: Neda Mccloud  : 1975  MRN: [id number]  Referring provider: Brian Clifford MD  Dx:   Encounter Diagnosis     ICD-10-CM    1  Closed fracture of distal end of right radius with routine healing, unspecified fracture morphology, subsequent encounter  S52 501D                      Subjective: I'm trying to use my hand at home  My pain level changes  Yesterday was a -3/10  Objective: See treatment diary below      Assessment: Tolerated treatment well  Patient exhibited good technique with therapeutic exercises  Reports most paresthesia has resolved  Only occasional tingling tip of thumb      Plan: Continue per plan of care  Precautions: ORIF 22  AROM only until 6 weeks post op (23), then may start PROM   Access Code: [email or web address]  URL: [email or web address]  Date: 2023  Prepared by: Cordova Masker    Exercises  • Seated Thumb IP Flexion AROM with Blocking - 3 x daily - 7 x weekly - 2 sets - 10 reps - 5 hold  • Thumb AROM Opposition To All Fingers - 3 x daily - 7 x weekly - 2 sets - 10 reps - 5 hold  • Seated Thumb Composite Flexion AROM - 3 x daily - 7 x weekly - 2 sets - 10 reps - 5 hold  • Finger Spreading - 3 x daily - 7 x weekly - 2 sets - 10 reps - 5 hold  • Wrist Tendon Gliding - 3 x daily - 7 x weekly - 2 sets - 10 reps - 5 hold  • Wrist AROM Flexion Extension - 3 x daily - 7 x weekly - 2 sets - 10 reps - 5 hold  • Wrist AROM Radial Ulnar Deviation - 3 x daily - 7 x weekly - 2 sets - 10 reps - 5 hold  • Seated Forearm Pronation and Supination AROM - 3 x daily - 7 x weekly - 2 sets - 10 reps - 5 hold          Date 23   Visit 6 7 RE 8 4 5   Manuals        Edema massage 5' 5' 5' 5' 5'   Scar massage 5' 5' 5' 5' 5'   Kinesio tape for scar mobility 2'    2'   Cupping   3'     Neuro Re-Ed         MNG distal x10       Stress loading scrub in stance   3' Ther Ex        Blocking thumb x10 x10 x20 x20 x10   Opposition with thumb slides x20 x10 x20 x20 x20   AROM digits 3' AA 5' AA 5' AA 5' AA 5' A   TGE        Isolated digit ext x10  x10 x10    Digit abd/add  Marbles 1 set   Marbles 1 set   Intrinsic minus stretch  10" x 10      AROM wrist  5' AA 5' AA 5' AA 5' AA   Praying hand stretch 10" x 10 x10 10" x 20 10" x 20 x10   Wrist maze   x10  x10   AROM FA 5' AA 5' AA 5' AA 5' AA 5' A   Cones for p/s        Wrist PREs ext, flex, dt 1# x 20  1# x 20 ea  Med pvc x 20 ea   FA PREs 1# x 20 ea  FA bar 1# x 20 Hammer stretch x 20 R FB x 30   Sustained grasp w/ pencils   2 sets 2 sets    Ball on wall for wrist ext     x10 w/ soft ball    strength HG 15# x 20  HG 15# x 20                     Ther Activity        Opposition        Translation     Marbles 1 set   HEP Median nerve glide Results of testing; POC; intrinsic minus stretch Cupping purpose procedures Passive wrist, FA stretches Kinesio tape wear, care, precuations                   Modalities        MHP 5' 5' 5' 5' 5'

## 2023-02-24 ENCOUNTER — OFFICE VISIT (OUTPATIENT)
Dept: OCCUPATIONAL THERAPY | Facility: CLINIC | Age: 48
End: 2023-02-24

## 2023-02-24 DIAGNOSIS — S52.501D CLOSED FRACTURE OF DISTAL END OF RIGHT RADIUS WITH ROUTINE HEALING, UNSPECIFIED FRACTURE MORPHOLOGY, SUBSEQUENT ENCOUNTER: Primary | ICD-10-CM

## 2023-02-24 NOTE — PROGRESS NOTES
Daily Note     Today's date: 2023  Patient name: Demond Juárez  : 1975  MRN: [id number]  Referring provider: Blanco Del Rosario MD  Dx:   Encounter Diagnosis     ICD-10-CM    1  Closed fracture of distal end of right radius with routine healing, unspecified fracture morphology, subsequent encounter  S52 316D                      Subjective: I didn't like the cupping  It gave me more pain  I had to ice my wrist      Objective: See treatment diary below      Assessment: Tolerated treatment well  Patient exhibited good technique with therapeutic exercises  Mild sweating in hand this date  Plan: Continue per plan of care  Precautions: ORIF 22  AROM only until 6 weeks post op (23), then may start PROM   Access Code: [email or web address]  URL: [email or web address]  Date: 2023  Prepared by: Doron Engel    Exercises  • Seated Thumb IP Flexion AROM with Blocking - 3 x daily - 7 x weekly - 2 sets - 10 reps - 5 hold  • Thumb AROM Opposition To All Fingers - 3 x daily - 7 x weekly - 2 sets - 10 reps - 5 hold  • Seated Thumb Composite Flexion AROM - 3 x daily - 7 x weekly - 2 sets - 10 reps - 5 hold  • Finger Spreading - 3 x daily - 7 x weekly - 2 sets - 10 reps - 5 hold  • Wrist Tendon Gliding - 3 x daily - 7 x weekly - 2 sets - 10 reps - 5 hold  • Wrist AROM Flexion Extension - 3 x daily - 7 x weekly - 2 sets - 10 reps - 5 hold  • Wrist AROM Radial Ulnar Deviation - 3 x daily - 7 x weekly - 2 sets - 10 reps - 5 hold  • Seated Forearm Pronation and Supination AROM - 3 x daily - 7 x weekly - 2 sets - 10 reps - 5 hold          Date 23   Visit 6 7 RE 8 9 5   Manuals        Edema massage 5' 5' 5' 5' 5'   Scar massage 5' 5' 5' 5' 5'   Kinesio tape for scar mobility 2'   DC 2'   Cupping   3' DC    Neuro Re-Ed         MNG distal x10       Stress loading scrub in stance   3' 3' 3'   Stress load carry    3# x 3 circuits Ther Ex        Blocking thumb x10 x10 x20  x20   Opposition with thumb slides x20 x10 x20 x10 x20   AROM digits 3' AA 5' AA 5' AA x10 AROM AA 5'   TGE        Isolated digit ext x10  x10 x10    Digit abd/add  Marbles 1 set   Marbles 1 set   Intrinsic minus stretch  10" x 10      AROM wrist  5' AA 5' AA 10' AA 5' AA   Praying hand stretch 10" x 10 x10 10" x 20 10" x 20 x10   Wrist maze   x10  x10   AROM FA 5' AA 5' AA 5' AA 5' AA 5' A   Cones for p/s        Wrist PREs ext, flex, dt 1# x 20  1# x 20 ea 2# x 20 e/f only Med pvc x 20 ea   FA PREs 1# x 20 ea  FA bar 1# x 20 G FB palm down; R FB palm up x 20 ea R FB x 30   Sustained grasp w/ pencils   2 sets     Ball on wall for wrist ext     x10 w/ soft ball    strength HG 15# x 20  HG 15# x 20 HG 25# x 20    Pinch strength    R,G CP on PW x 2 sets            Ther Activity        Opposition        Translation     Marbles 1 set   HEP Median nerve glide Results of testing; POC; intrinsic minus stretch Cupping purpose procedures  Kinesio tape wear, care, precuations                   Modalities        MHP 5' 5' 5' 5' 5'

## 2023-02-27 ENCOUNTER — APPOINTMENT (OUTPATIENT)
Dept: OCCUPATIONAL THERAPY | Facility: CLINIC | Age: 48
End: 2023-02-27

## 2023-07-06 ENCOUNTER — TELEPHONE (OUTPATIENT)
Dept: OBGYN CLINIC | Facility: CLINIC | Age: 48
End: 2023-07-06

## 2023-08-03 NOTE — TELEPHONE ENCOUNTER
Pt asking if [facility] works with [employer], I am unsure so offered to reach out to the provider and ask, then talk to her next week.

## 2024-04-05 ENCOUNTER — TELEPHONE (OUTPATIENT)
Age: 49
End: 2024-04-05

## 2024-04-05 NOTE — TELEPHONE ENCOUNTER
First Name: shantal  Last Name: ann marie  YOB: 1975  Email: [email or web address]  Phone: [phone]   Address: [facility] [address]  City: [place]  State: [place]  Zip: [phone]     for pt to cb

## 2024-05-16 ENCOUNTER — OFFICE VISIT (OUTPATIENT)
Dept: OBGYN CLINIC | Facility: CLINIC | Age: 49
End: 2024-05-16
Payer: COMMERCIAL

## 2024-05-16 VITALS
BODY MASS INDEX: 24.45 KG/M2 | HEIGHT: 63 IN | SYSTOLIC BLOOD PRESSURE: 128 MMHG | DIASTOLIC BLOOD PRESSURE: 66 MMHG | WEIGHT: 138 LBS

## 2024-05-16 DIAGNOSIS — N95.0 POST-MENOPAUSAL BLEEDING: Primary | ICD-10-CM

## 2024-05-16 DIAGNOSIS — Z12.31 SCREENING MAMMOGRAM, ENCOUNTER FOR: ICD-10-CM

## 2024-05-16 PROCEDURE — 99203 OFFICE O/P NEW LOW 30 MIN: CPT | Performed by: OBSTETRICS & GYNECOLOGY

## 2024-05-16 RX ORDER — CRANBERRY FRUIT EXTRACT 650 MG
1 CAPSULE ORAL DAILY
COMMUNITY

## 2024-05-16 RX ORDER — PROGESTERONE 200 MG/1
1 CAPSULE ORAL EVERY OTHER DAY
COMMUNITY

## 2024-05-16 RX ORDER — ESTRADIOL 2 MG/1
2 TABLET ORAL DAILY
COMMUNITY

## 2024-05-16 NOTE — PROGRESS NOTES
"Assessment/Plan:     There are no diagnoses linked to this encounter.      48-year-old female  Postmenopausal bleeding  Taking hormone replacement therapy bioidentical  Plan  Pelvic ultrasound  Mammogram  Return to office for endometrial biopsy        Subjective:      Patient ID: Ava Kelly is a 48 y.o. female.    HPI  49 yo female present to the office today secondary to episode of vaginal bleeding   3   Pmh NONE  Psh WRIST SURGERY and breast implant  Medcs taking bio identical hormone  Menopause / had a period in may and no bleeding since till few month ago started bleeding   Take HRT online taking estradiol 1 mg daily and Prometrium 200 mg daily and DHEA-S  Started bleeding she told to stop p for 2 w then do 14 days every month and that   Didn't help with her bleeding   And now she noted she s taking p everyother day and that help with bleeding  And patient's bio identical help with sleeping and Hot flashes and vaginal dryness    Patient desires to continue hormone replacement therapy  Explained to the patient with the finding of bleeding I recommend to proceed with pelvic ultrasound evaluate endometrial cavity as well as endometrial biopsy then we can discuss dose and regimen for hormone replacement therapy  Plan explained and discussed with patient all patient questions answered patient was satisfied    The following portions of the patient's history were reviewed and updated as appropriate: allergies, current medications, past family history, past medical history, past social history, past surgical history and problem list.    Review of Systems      Objective:      /66 (BP Location: Left arm, Patient Position: Sitting, Cuff Size: Adult)   Ht 5' 3\" (1.6 m)   Wt 62.6 kg (138 lb)   BMI 24.45 kg/m²          Physical Exam  Constitutional:       Appearance: Normal appearance.   Neurological:      General: No focal deficit present.      Mental Status: She is alert and oriented to person, " place, and time.   Psychiatric:         Mood and Affect: Mood normal.         Behavior: Behavior normal.

## 2024-07-09 ENCOUNTER — OFFICE VISIT (OUTPATIENT)
Age: 49
End: 2024-07-09
Payer: COMMERCIAL

## 2024-07-09 VITALS — BODY MASS INDEX: 24.27 KG/M2 | WEIGHT: 137 LBS | TEMPERATURE: 98.8 F | HEIGHT: 63 IN

## 2024-07-09 DIAGNOSIS — Z13.89 SCREENING FOR SKIN CONDITION: Primary | ICD-10-CM

## 2024-07-09 DIAGNOSIS — L82.1 SEBORRHEIC KERATOSIS: ICD-10-CM

## 2024-07-09 PROCEDURE — 99202 OFFICE O/P NEW SF 15 MIN: CPT | Performed by: DERMATOLOGY

## 2024-07-09 NOTE — PATIENT INSTRUCTIONS
"MELANOCYTIC NEVI (\"Moles\")    Melanocytic nevi (\"moles\") are tan or brown, raised or flat areas of the skin which have an increased number of melanocytes. Melanocytes are the cells in our body which make pigment and account for skin color.    Some moles are present at birth (I.e., \"congenital nevi\"), while others come up later in life (i.e., \"acquired nevi\").  The sun can stimulate the body to make more moles.  Sunburns are not the only thing that triggers more moles.  Chronic sun exposure can do it too.     Clinically distinguishing a healthy mole from melanoma may be difficult, even for experienced dermatologists. The \"ABCDE's\" of moles have been suggested as a means of helping to alert a person to a suspicious mole and the possible increased risk of melanoma.  The suggestions for raising alert are as follows:    Asymmetry: Healthy moles tend to be symmetric, while melanomas are often asymmetric.  Asymmetry means if you draw a line through the mole, the two halves do not match in color, size, shape, or surface texture. Asymmetry can be a result of rapid enlargement of a mole, the development of a raised area on a previously flat lesion, scaling, ulceration, bleeding or scabbing within the mole.  Any mole that starts to demonstrate \"asymmetry\" should be examined promptly by a board certified dermatologist.     Border: Healthy moles tend to have discrete, even borders.  The border of a melanoma often blends into the normal skin and does not sharply delineate the mole from normal skin.  Any mole that starts to demonstrate \"uneven borders\" should be examined promptly by a board certified dermatologist.     Color: Healthy moles tend to be one color throughout.  Melanomas tend to be made up of different colors ranging from dark black, blue, white, or red.  Any mole that demonstrates a color change should be examined promptly by a board certified dermatologist.     Diameter: Healthy moles tend to be smaller than 0.6 cm " "in size; an exception are \"congenital nevi\" that can be larger.  Melanomas tend to grow and can often be greater than 0.6 cm (1/4 of an inch, or the size of a pencil eraser). This is only a guideline, and many normal moles may be larger than 0.6 cm without being unhealthy.  Any mole that starts to change in size (small to bigger or bigger to smaller) should be examined promptly by a board certified dermatologist.     Evolving: Healthy moles tend to \"stay the same.\"  Melanomas may often show signs of change or evolution such as a change in size, shape, color, or elevation.  Any mole that starts to itch, bleed, crust, burn, hurt, or ulcerate or demonstrate a change or evolution should be examined promptly by a board certified dermatologist.      Dysplastic Nevi  Dysplastic moles are moles that fit the ABCDE rules of melanoma but are not identified as melanomas when examined under the microscope.  They may indicate an increased risk of melanoma in that person. If there is a family history of melanoma, most experts agree that the person may be at an increased risk for developing a melanoma.  Experts still do not agree on what dysplastic moles mean in patients without a personal or family history of melanoma.  Dysplastic moles are usually larger than common moles and have different colors within it with irregular borders. The appearance can be very similar to a melanoma. Biopsies of dysplastic moles may show abnormalities which are different from a regular mole.      Melanoma  Malignant melanoma is a type of skin cancer that can be deadly if it spreads throughout the body. The incidence of melanoma in the [place] is growing faster than any other cancer. Melanoma usually grows near the surface of the skin for a period of time, and then begins to grow deeper into the skin. Once it grows deeper into the skin, the risk of spread to other organs greatly increases. Therefore, early detection and removal of a malignant " "melanoma may result in a better chance at a complete cure; removal after the tumor has spread may not be as effective, leading to worse clinical outcomes such as death.    The true rate of nevus transformation into a melanoma is unknown. It has been estimated that the lifetime risk for any acquired melanocytic nevus on any 20-year-old individual transforming into melanoma by age 80 is 0.03% (1 in 3,164) for men and 0.009% (1 in 10,800) for women.     The appearance of a \"new mole\" remains one of the most reliable methods for identifying a malignant melanoma.  Occasionally, melanomas appear as rapidly growing, blue-black, dome-shaped bumps within a previous mole or previous area of normal skin.  Other times, melanomas are suspected when a mole suddenly appears or changes. Itching, burning, or pain in a pigmented lesion should increase suspicion, but most patients with early melanoma have no skin discomfort whatsoever.  Melanoma can occur anywhere on the skin, including areas that are difficult for self-examination. Many melanomas are first noticed by other family members.  Suspicious-looking moles may be removed for microscopic examination.       You may be able to prevent death from melanoma by doing two simple things:    Try to avoid unnecessary sun exposure and protect your skin when it is exposed to the sun.  People who live near the equator, people who have intermittent exposures to large amounts of sun, and people who have had sunburns in childhood or adolescence have an increased risk for melanoma. Sun sense and vigilant sun protection may be keys to helping to prevent melanoma.  We recommend wearing UPF-rated sun protective clothing and sunglasses whenever possible and applying a moisturizer-sunscreen combination product (SPF 50+) such as Neutrogena Daily Defense to sun exposed areas of skin at least three times a day.    Have your moles regularly examined by a board certified dermatologist AND by yourself or " "a family member/friend at home.  We recommend that you have your moles examined at least once a year by a board certified dermatologist.  Use your birthday as an annual reminder to have your \"Birthday Suit\" (I.e., your skin) examined; it is a nice birthday gift to yourself to know that your skin is healthy appearing!  Additionally, at-home self examinations may be helpful for detecting a possible melanoma.  Use the ABCDEs we discussed and check your moles once a month at home.        SEBORRHEIC KERATOSIS  A seborrheic keratosis is a harmless warty spot that appears during adult life as a common sign of skin aging.  Seborrheic keratoses can arise on any area of skin, covered or uncovered, with the usual exception of the palms and soles. They do not arise from mucous membranes. Seborrheic keratoses can have highly variable appearance.      Seborrheic keratoses are extremely common. It has been estimated that over 90% of adults over the age of 60 years have one or more of them. They occur in males and females of all races, typically beginning to erupt in the 30s or 40s. They are uncommon under the age of 20 years.  The precise cause of seborrhoeic keratoses is not known.  Seborrhoeic keratoses are considered degenerative in nature. As time goes by, seborrheic keratoses tend to become more numerous. Some people inherit a tendency to develop a very large number of them; some people may have hundreds of them.    The name \"seborrheic keratosis\" is misleading, because these lesions are not limited to a seborrhoeic distribution (scalp, mid-face, chest, upper back), nor are they formed from sebaceous glands, nor are they associated with sebum -- which is greasy.  Seborrheic keratosis may also be called \"SK,\" \"Seb K,\" \"basal cell papilloma,\" \"senile wart,\" or \"barnacle.\"      There is no easy way to remove multiple lesions on a single occasion.  Unless a specific lesion is \"inflamed\" and is causing pain or stinging/burning or " "is bleeding, most insurance companies do not authorize treatment.      ANGIOMA (\"CHERRY ANGIOMA\")  Woodard angiomas markedly increase in number from about the age of 40, so it has been estimated that 75% of people over 75 years of age have them. Although they also called \"senile angiomas,\" they can occur in young people too - 5% of adolescents have been found to have them.     Cherry angiomas are very common in males and females of any age or race, with no difference in sexes or races affected. They are however more noticeable in white skin than in skin of colour.  There may be a family history of similar lesions. Eruptive (very large number appearing in a short period of time) cherry angiomas have been rarely reported to be associated with internal malignancy and pregnancy.    "

## 2024-07-09 NOTE — PROGRESS NOTES
"[facility] Note     Patient Name: Ava Kelly  Encounter Date: July 9, 2024     Have you been cared for by a [facility] Dermatologist in the last 3 years and, if so, which description applies to you?    NO.   I am considered a \"new\" patient and must complete all patient intake questions. I am FEMALE/of child-bearing potential.    REVIEW OF SYSTEMS:  Have you recently had or currently have any of the following? Recent fever or chills? No  Any non-healing wound? No  Are you pregnant or planning to become pregnant? No  Are you currently or planning to be nursing or breast feeding? No   PAST MEDICAL HISTORY:  Have you personally ever had or currently have any of the following?  If \"YES,\" then please provide more detail. Skin cancer (such as Melanoma, Basal Cell Carcinoma, Squamous Cell Carcinoma?  No  Tuberculosis, HIV/AIDS, Hepatitis B or C: No  Radiation Treatment No   HISTORY OF IMMUNOSUPPRESSION:   Do you have a history of any of the following:  Systemic Immunosuppression such as Diabetes, Biologic or Immunotherapy, Chemotherapy, Organ Transplantation, Bone Marrow Transplantation?  No    Answering \"YES\" requires the addition of the dotphrase \"IMMUNOSUPPRESSED\" as the first diagnosis of the patient's visit.   FAMILY HISTORY:  Any \"first degree relatives\" (parent, brother, sister, or child) with the following?    Skin Cancer, Pancreatic or Other Cancer? YES, Mother - Breast Cancer   PATIENT EXPERIENCE:    Do you want the Dermatologist to perform a COMPLETE skin exam today including a clinical examination under the \"bra and underwear\" areas?  NO  If necessary, do we have your permission to call and leave a detailed message on your Preferred Phone number that includes your specific medical information?  Yes      No Known Allergies   Current Outpatient Medications:     DHEA 25 MG CAPS, Take 1 capsule by mouth in the morning, Disp: , Rfl:     estradiol (ESTRACE) 2 MG tablet, Take 2 mg by mouth " "daily, Disp: , Rfl:     ibuprofen (MOTRIN) 200 mg tablet, Take 200 mg by mouth every 6 (six) hours as needed for mild pain, Disp: , Rfl:     Progesterone 200 MG CAPS, Take 1 capsule by mouth every other day, Disp: , Rfl:           Whom besides the patient is providing clinical information about today's encounter?   NO ADDITIONAL HISTORIAN (patient alone provided history)    Physical Exam and Assessment/Plan by Diagnosis:    Chief complaint: Patient is a 47 y/o female present for a growing and changing spot of concern on the Right Lateral lower leg. Pt states she noticed it back in 2018 and it has now grown in size and changed color. Pt declined skin check.    SEBORRHEIC KERATOSIS; NON-INFLAMED    Physical Exam:  Anatomic Location Affected:  Right Lower Lateral Leg  Morphological Description:  Flat and raised, waxy, smooth to warty textured, yellow to brownish-grey to dark brown to blackish, discrete, \"stuck-on\" appearing papules.  Pertinent Positives:  Pertinent Negatives:    Additional History of Present Condition:  Patient reports new bumps on the skin.  Denies itch, burn, pain, bleeding or ulceration.  Present constantly; nothing seems to make it worse or better.  No prior treatment.      Assessment and Plan:  Based on a thorough discussion of this condition and the management approach to it (including a comprehensive discussion of the known risks, side effects and potential benefits of treatment), the patient (family) agrees to implement the following specific plan:  Benign and reassured  Can be removed cosmetically if desired  $150 to treat up to 10 lesions, and if over 10 lesions, then additional $10/lesion thereafter    Seborrheic Keratosis  A seborrheic keratosis is a harmless warty spot that appears during adult life as a common sign of skin aging.  Seborrheic keratoses can arise on any area of skin, covered or uncovered, with the usual exception of the palms and soles. They do not arise from mucous " "membranes. Seborrheic keratoses can have highly variable appearance.      Seborrheic keratoses are extremely common. It has been estimated that over 90% of adults over the age of 60 years have one or more of them. They occur in males and females of all races, typically beginning to erupt in the 30s or 40s. They are uncommon under the age of 20 years.  The precise cause of seborrhoeic keratoses is not known.  Seborrhoeic keratoses are considered degenerative in nature. As time goes by, seborrheic keratoses tend to become more numerous. Some people inherit a tendency to develop a very large number of them; some people may have hundreds of them.    The name \"seborrheic keratosis\" is misleading, because these lesions are not limited to a seborrhoeic distribution (scalp, mid-face, chest, upper back), nor are they formed from sebaceous glands, nor are they associated with sebum -- which is greasy.  Seborrheic keratosis may also be called \"SK,\" \"Seb K,\" \"basal cell papilloma,\" \"senile wart,\" or \"barnacle.\"      Researchers have noted:  Eruptive seborrhoeic keratoses can follow sunburn or dermatitis  Skin friction may be the reason they appear in body folds  Viral cause (e.g., human papillomavirus) seems unlikely  Stable and clonal mutations or activation of FRFR3, PIK3CA, PATRICE, AKT1 and EGFR genes are found in seborrhoeic keratoses  Seborrhoeic keratosis can arise from solar lentigo  FRFR3 mutations also arise in solar lentigines. These mutations are associated with increased age and location on the head and neck, suggesting a role of ultraviolet radiation in these lesions  Seborrheic keratoses do not harbour tumour suppressor gene mutations  Epidermal growth factor receptor inhibitors, which are used to treat some cancers, often result in an increase in verrucal (warty) keratoses.    There is no easy way to remove multiple lesions on a single occasion.  Unless a specific lesion is \"inflamed\" and is causing pain or " stinging/burning or is bleeding, most insurance companies do not authorize treatment.    Scribe Attestation      I,:  Keiry Kohli am acting as a scribe while in the presence of the attending physician.:       I,:  Nathan Flor MD personally performed the services described in this documentation    as scribed in my presence.:

## 2024-12-06 ENCOUNTER — TELEPHONE (OUTPATIENT)
Age: 49
End: 2024-12-06

## 2024-12-06 NOTE — TELEPHONE ENCOUNTER
Current NP of  wants to switch to see Sonja Thayer. Only saw  once and prefers to switch. Informed pt next available is not until Jan 2026 pt wants to know why she has to wait until then why can't she do a virtual informed pt there are other pt's also waiting to see her and I believe it will be the same even if it is a virtual appt. She would like someone to please call her back. Pt did have an appt sched with [facility] in October 2023 but no showed. Per pt states there was a miscommunication with that appt. Please call pt to further discuss.

## 2024-12-09 NOTE — TELEPHONE ENCOUNTER
Attempted to reach patient to notify of long wait list, left brief voicemail per INCOMPLETE medical communication consent

## 2025-01-21 ENCOUNTER — HOSPITAL ENCOUNTER (OUTPATIENT)
Dept: MAMMOGRAPHY | Facility: CLINIC | Age: 50
Discharge: HOME | End: 2025-01-21
Payer: COMMERCIAL

## 2025-01-21 VITALS — BODY MASS INDEX: 23.21 KG/M2 | WEIGHT: 131 LBS | HEIGHT: 63 IN

## 2025-01-21 DIAGNOSIS — Z12.31 SCREENING MAMMOGRAM, ENCOUNTER FOR: ICD-10-CM

## 2025-01-21 PROCEDURE — 77067 SCR MAMMO BI INCL CAD: CPT

## 2025-01-21 PROCEDURE — 77063 BREAST TOMOSYNTHESIS BI: CPT

## 2025-02-05 ENCOUNTER — RESULTS FOLLOW-UP (OUTPATIENT)
Dept: OBGYN CLINIC | Facility: CLINIC | Age: 50
End: 2025-02-05

## 2025-02-05 NOTE — TELEPHONE ENCOUNTER
Patient takes 5,000 - 10,000 Vitamin D,  IU due to low levels noted after wrist injury. Patient states ortho recommended 5,000 IU, patient concerned if this can cause the calcifications seen on imaging?  Patient [facility] for [facility] to review and advise[facility].   Patient transferred to central Scheduling   ( breast imaging) for follow up appts.